# Patient Record
Sex: FEMALE | Race: WHITE | NOT HISPANIC OR LATINO | URBAN - METROPOLITAN AREA
[De-identification: names, ages, dates, MRNs, and addresses within clinical notes are randomized per-mention and may not be internally consistent; named-entity substitution may affect disease eponyms.]

---

## 2017-07-21 ENCOUNTER — IMPORTED ENCOUNTER (OUTPATIENT)
Dept: URBAN - METROPOLITAN AREA CLINIC 59 | Facility: CLINIC | Age: 57
End: 2017-07-21

## 2017-07-21 PROBLEM — H04.123 TEAR FILM INSUFFICIENCY OF BILATERAL LACRIMAL GLANDS: Noted: 2017-07-21

## 2017-07-21 PROCEDURE — 92015 DETERMINE REFRACTIVE STATE: CPT

## 2017-07-21 PROCEDURE — 99213 OFFICE O/P EST LOW 20 MIN: CPT

## 2018-07-20 ENCOUNTER — IMPORTED ENCOUNTER (OUTPATIENT)
Dept: URBAN - METROPOLITAN AREA CLINIC 59 | Facility: CLINIC | Age: 58
End: 2018-07-20

## 2018-07-20 PROBLEM — H04.123 TEAR FILM INSUFFICIENCY OF BILATERAL LACRIMAL GLANDS: Noted: 2018-07-20

## 2018-07-20 PROCEDURE — 99213 OFFICE O/P EST LOW 20 MIN: CPT

## 2018-07-20 PROCEDURE — 92015 DETERMINE REFRACTIVE STATE: CPT

## 2018-10-18 ENCOUNTER — TELEPHONE (OUTPATIENT)
Dept: NEUROLOGY | Facility: CLINIC | Age: 58
End: 2018-10-18

## 2018-10-18 NOTE — TELEPHONE ENCOUNTER
Oralia fell and hit her head 1.5 weeks ago while out shopping. Ever since, she has had severe headaches from her temple down into her jaw. She had a CT scan that was normal, but she is in severe pain. She is taking tylenol, but it is not helping.

## 2018-12-14 ENCOUNTER — OFFICE VISIT (OUTPATIENT)
Dept: NEUROLOGY | Facility: CLINIC | Age: 58
End: 2018-12-14
Attending: PSYCHIATRY & NEUROLOGY
Payer: COMMERCIAL

## 2018-12-14 VITALS — HEART RATE: 74 BPM | RESPIRATION RATE: 14 BRPM | SYSTOLIC BLOOD PRESSURE: 106 MMHG | DIASTOLIC BLOOD PRESSURE: 70 MMHG

## 2018-12-14 DIAGNOSIS — R51.9 NONINTRACTABLE HEADACHE, UNSPECIFIED CHRONICITY PATTERN, UNSPECIFIED HEADACHE TYPE: Primary | ICD-10-CM

## 2018-12-14 DIAGNOSIS — G08 CEREBRAL VENOUS SINUS THROMBOSIS: ICD-10-CM

## 2018-12-14 PROCEDURE — 99213 OFFICE O/P EST LOW 20 MIN: CPT | Performed by: PSYCHIATRY & NEUROLOGY

## 2018-12-14 RX ORDER — TERBUTALINE SULFATE 2.5 MG/1
1 TABLET ORAL 3 TIMES DAILY PRN
COMMUNITY
Start: 2018-09-17

## 2018-12-14 RX ORDER — BUDESONIDE AND FORMOTEROL FUMARATE DIHYDRATE 160; 4.5 UG/1; UG/1
2 AEROSOL RESPIRATORY (INHALATION) 2 TIMES DAILY
COMMUNITY
Start: 2018-06-12

## 2018-12-14 RX ORDER — LORAZEPAM 0.5 MG/1
0.5 TABLET ORAL 3 TIMES DAILY PRN
COMMUNITY
Start: 2017-09-04

## 2018-12-14 RX ORDER — MONTELUKAST SODIUM 10 MG/1
10 TABLET ORAL DAILY
COMMUNITY
Start: 2017-11-30

## 2018-12-14 RX ORDER — WARFARIN SODIUM 5 MG/1
10 TABLET ORAL DAILY
COMMUNITY
Start: 2018-10-23

## 2018-12-14 RX ORDER — ALBUTEROL SULFATE 90 UG/1
2 INHALANT RESPIRATORY (INHALATION) EVERY 6 HOURS PRN
COMMUNITY

## 2018-12-14 NOTE — PROGRESS NOTES
"Oralia Hernandes is a 58 y.o. female  12/14/2018  Chalo Simth MD    Neurology Follow Up Note    Subjective     Oralia Hernandes is a 58 y.o. female who is being evaluated  for headache.  I previously saw the patient about 1 year ago.  As you know she has a history of a venous sinus thrombosis and hypercoagulable state.  She was stable on Coumadin and I recommended no changes in her management.    Since her last visit, the patient reported that she had been doing well.  On October 6 she unfortunately fell at a [employer] and struck her right head forcefully on boxes of tiles.  While she did not lose consciousness she was extremely shaken up and \"saw stars\".  She developed a headache which progressively got worse over time.  A CAT scan of the head was unremarkable.  Over time she developed severe pain over the right head, face and teeth.  She was unable to work.    The patient returned to the emergency room.  A repeat CAT scan revealed a severe sinus infection.  She was treated with steroids, antibiotics and analgesics.  Over time, her condition has slowly improved although she is not 100% back to her baseline.  Fortunately she has had no other episodes of transient or static neurologic dysfunction.  A comprehensive review of systems was otherwise unremarkable.    Review of Systems  Constitutional: negative  Eyes: negative  Ears, nose, mouth, throat, and face: negative  Respiratory: negative  Cardiovascular: negative  Gastrointestinal: negative  Genitourinary:negative  Integument/breast: negative  Hematologic/lymphatic: negative  Musculoskeletal:negative  Neurological: negative  Behavioral/Psych: negative  Endocrine: negative  Allergic/Immunologic: negative    Current Outpatient Prescriptions   Medication Sig Dispense Refill   • budesonide-formoterol (SYMBICORT) 160-4.5 mcg/actuation inhaler Inhale 2 puffs 2 times daily.     • LORazepam (ATIVAN) 0.5 mg tablet Take 0.5 mg by mouth 3 (three) times a day as needed.   "   • montelukast (SINGULAIR) 10 mg tablet Take 10 mg by mouth daily.     • albuterol HFA (PROVENTIL HFA) 90 mcg/actuation inhaler Inhale 2 puffs every 6 (six) hours as needed.     • terbutaline (BRETHINE) 2.5 mg tablet Take 1 tablet by mouth 3 (three) times a day as needed.     • warfarin (COUMADIN) 5 mg tablet Take 10 mg by mouth once daily.       No current facility-administered medications for this visit.        PMH/SH/FH : Unchanged since previous visit.    Objective     Physical Exam  /70   Pulse 74   Resp 14     General Appearance:  Alert, no distress, appears stated age               Neurologic Exam:  Alert and oriented. Attention, concentration, memory, language, visual spatial orientation, executive function is normal.    Pupils equal round and reactive to light. Extraocular movement full with normal pursuit + saccades. No nystagmus noted.   Facial strength and sensation is normal. Hearing normal.  The tongue and uvula were midline. No dysarthria or dysphagia.   Strength was 5/5 in bulbar, axial + extremity muscles.There was normal bulk and tone with no abnormal movements.   The sensory examination was normal to touch, temperature and pain, vibration and proprioception. There was no dysmetria or cerebellar signs.   The gait was narrow based. Patient was able to tandem walk. Negative Romberg sign. Reflexes were ++ and symmetric. Merrill sign was negative. Plantar responses were flexor.       Problem List Items Addressed This Visit     Cerebral venous sinus thrombosis    Current Assessment & Plan     Trisha has a history of a venous sinus thrombosis and hypercoagulability.  Currently she is on full anticoagulation and is followed closely by hematology         Nonintractable headache - Primary    Current Assessment & Plan     The patient likely had a mild concussion and is recovering from a sinus infection.  She will continue to pursue conservative measures.               It was a real pleasure treating  Oralia Hernandes today, thank you for allowing me to participate in the medical care. If you have any questions, please call me at any time. Oralia Hernandes will follow up with me in the coming weeks to months and keep me updated by telephone. Oralia Hernandes knows to notify me immediately if there is any change in the condition or if there are any new symptoms of transient or static neurologic dysfunction.    Justin Branch MD

## 2018-12-14 NOTE — LETTER
"December 14, 2018     Chalo Smith MD  [address]  [address]  [place] [address]    Patient: Oralia Hernandes   YOB: 1960   Date of Visit: 12/14/2018       Dear Dr. Smith:    Thank you for referring Oralia Hernandes to me for evaluation. Below are my notes for this consultation.    If you have questions, please do not hesitate to call me. I look forward to following your patient along with you.         Sincerely,        Justin Branch MD        CC: No Recipients  Justin Branch MD  12/14/2018 10:47 AM  Signed  Oralia Hernandes is a 58 y.o. female  12/14/2018  Chalo Smith MD    Neurology Follow Up Note    Subjective     Oralia Hernandes is a 58 y.o. female who is being evaluated  for headache.  I previously saw the patient about 1 year ago.  As you know she has a history of a venous sinus thrombosis and hypercoagulable state.  She was stable on Coumadin and I recommended no changes in her management.    Since her last visit, the patient reported that she had been doing well.  On October 6 she unfortunately fell at a [employer] and struck her right head forcefully on boxes of tiles.  While she did not lose consciousness she was extremely shaken up and \"saw stars\".  She developed a headache which progressively got worse over time.  A CAT scan of the head was unremarkable.  Over time she developed severe pain over the right head, face and teeth.  She was unable to work.    The patient returned to the emergency room.  A repeat CAT scan revealed a severe sinus infection.  She was treated with steroids, antibiotics and analgesics.  Over time, her condition has slowly improved although she is not 100% back to her baseline.  Fortunately she has had no other episodes of transient or static neurologic dysfunction.  A comprehensive review of systems was otherwise unremarkable.    Review of Systems  Constitutional: negative  Eyes: negative  Ears, nose, mouth, throat, and face: negative  Respiratory: " negative  Cardiovascular: negative  Gastrointestinal: negative  Genitourinary:negative  Integument/breast: negative  Hematologic/lymphatic: negative  Musculoskeletal:negative  Neurological: negative  Behavioral/Psych: negative  Endocrine: negative  Allergic/Immunologic: negative    Current Outpatient Prescriptions   Medication Sig Dispense Refill   • budesonide-formoterol (SYMBICORT) 160-4.5 mcg/actuation inhaler Inhale 2 puffs 2 times daily.     • LORazepam (ATIVAN) 0.5 mg tablet Take 0.5 mg by mouth 3 (three) times a day as needed.     • montelukast (SINGULAIR) 10 mg tablet Take 10 mg by mouth daily.     • albuterol HFA (PROVENTIL HFA) 90 mcg/actuation inhaler Inhale 2 puffs every 6 (six) hours as needed.     • terbutaline (BRETHINE) 2.5 mg tablet Take 1 tablet by mouth 3 (three) times a day as needed.     • warfarin (COUMADIN) 5 mg tablet Take 10 mg by mouth once daily.       No current facility-administered medications for this visit.        PMH/SH/FH : Unchanged since previous visit.    Objective     Physical Exam  /70   Pulse 74   Resp 14     General Appearance:  Alert, no distress, appears stated age               Neurologic Exam:  Alert and oriented. Attention, concentration, memory, language, visual spatial orientation, executive function is normal.    Pupils equal round and reactive to light. Extraocular movement full with normal pursuit + saccades. No nystagmus noted.   Facial strength and sensation is normal. Hearing normal.  The tongue and uvula were midline. No dysarthria or dysphagia.   Strength was 5/5 in bulbar, axial + extremity muscles.There was normal bulk and tone with no abnormal movements.   The sensory examination was normal to touch, temperature and pain, vibration and proprioception. There was no dysmetria or cerebellar signs.   The gait was narrow based. Patient was able to tandem walk. Negative Romberg sign. Reflexes were ++ and symmetric. Merrill sign was negative. Plantar  responses were flexor.       Problem List Items Addressed This Visit     Cerebral venous sinus thrombosis    Current Assessment & Plan     Trisha has a history of a venous sinus thrombosis and hypercoagulability.  Currently she is on full anticoagulation and is followed closely by hematology         Nonintractable headache - Primary    Current Assessment & Plan     The patient likely had a mild concussion and is recovering from a sinus infection.  She will continue to pursue conservative measures.               It was a real pleasure treating Oralia Hernandes today, thank you for allowing me to participate in the medical care. If you have any questions, please call me at any time. Oralia Hernandes will follow up with me in the coming weeks to months and keep me updated by telephone. Oralia Hernandes knows to notify me immediately if there is any change in the condition or if there are any new symptoms of transient or static neurologic dysfunction.    Justin Branch MD

## 2018-12-14 NOTE — ASSESSMENT & PLAN NOTE
The patient likely had a mild concussion and is recovering from a sinus infection.  She will continue to pursue conservative measures.

## 2018-12-14 NOTE — ASSESSMENT & PLAN NOTE
has a history of a venous sinus thrombosis and hypercoagulability.  Currently she is on full anticoagulation and is followed closely by hematology

## 2019-08-06 ENCOUNTER — IMPORTED ENCOUNTER (OUTPATIENT)
Dept: URBAN - METROPOLITAN AREA CLINIC 59 | Facility: CLINIC | Age: 59
End: 2019-08-06

## 2019-08-06 PROBLEM — H04.123 TEAR FILM INSUFFICIENCY OF BILATERAL LACRIMAL GLANDS: Noted: 2019-08-06

## 2019-08-06 PROCEDURE — 99213 OFFICE O/P EST LOW 20 MIN: CPT

## 2020-02-18 ENCOUNTER — OFFICE VISIT (OUTPATIENT)
Dept: NEUROLOGY | Facility: CLINIC | Age: 60
End: 2020-02-18
Payer: COMMERCIAL

## 2020-02-18 VITALS
DIASTOLIC BLOOD PRESSURE: 80 MMHG | HEART RATE: 73 BPM | RESPIRATION RATE: 14 BRPM | OXYGEN SATURATION: 98 % | SYSTOLIC BLOOD PRESSURE: 122 MMHG

## 2020-02-18 DIAGNOSIS — R42 VERTIGO: ICD-10-CM

## 2020-02-18 DIAGNOSIS — R51.9 NONINTRACTABLE HEADACHE, UNSPECIFIED CHRONICITY PATTERN, UNSPECIFIED HEADACHE TYPE: ICD-10-CM

## 2020-02-18 DIAGNOSIS — G08 CEREBRAL VENOUS SINUS THROMBOSIS: Primary | ICD-10-CM

## 2020-02-18 PROBLEM — R49.0 HOARSENESS: Status: ACTIVE | Noted: 2018-09-07

## 2020-02-18 PROBLEM — J45.20 MILD INTERMITTENT ASTHMA WITHOUT COMPLICATION: Status: ACTIVE | Noted: 2018-09-07

## 2020-02-18 PROCEDURE — 99214 OFFICE O/P EST MOD 30 MIN: CPT | Performed by: PSYCHIATRY & NEUROLOGY

## 2020-02-18 NOTE — ASSESSMENT & PLAN NOTE
The patient has a history of a venous sinus thrombosis and hypercoagulability.  Currently she is on full anticoagulation and is followed closely by hematology.  She has been clinically asymptomatic.

## 2020-02-18 NOTE — LETTER
February 18, 2020     Chalo Smith MD  [address]  [address]  [place] [address]    Patient: Oralia Hernandes  YOB: 1960  Date of Visit: 2/18/2020      Dear Dr. Smith:    Thank you for referring Oralia Hernandes to me for evaluation. Below are my notes for this consultation.    If you have questions, please do not hesitate to call me. I look forward to following your patient along with you.         Sincerely,        Justin Branch MD        CC: No Recipients  Justin Branch MD  2/18/2020  9:22 AM  Signed  Oralia Hernandes is a 59 y.o. female  2/18/2020  Chalo Smith MD    Neurology Follow Up Note    Subjective     Oralia Hernandes is a 59 y.o. female who is being evaluated  for venous sinus thrombosis.  I previously saw the patient about 1 year ago.  At that time she was stable on Coumadin.  She was having intermittent headaches which were gradually improving with conservative measures.    Since her last visit, overall the patient in general has been doing well.  Unfortunately she had a bout of severe vertigo which lasted several weeks.  She went to an outside hospital where an MRI of the brain and comprehensive work-up were unremarkable.  She was evaluated by an ENT specialist at [facility] and fortunately was found to have benign paroxysmal positional vertigo.  Fortunately over the past few months she has been otherwise completely asymptomatic.    The patient also reported that in December she had an episode of imbalance in which she fell and severely twisted her right ankle.  It has taken about 2 months for this injury to improve.  Fortunately the patient's headaches have essentially resolved.  Every once in a while she will have a mild headache if she does not drink coffee.  Conservative measures or an over-the-counter anti-inflammatory medication or analgesic work well in aborting her pain.  The patient has been eating and sleeping well and her mood is good.  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  Detailed neurologic and medical review of systems was otherwise unremarkable.  There were no symptoms to suggest increased intracranial pressure, meningitis or systemic illness.    Review of Systems  Constitutional: negative  Eyes: negative  Ears, nose, mouth, throat, and face: negative  Respiratory: negative  Cardiovascular: negative  Gastrointestinal: negative  Genitourinary:negative  Integument/breast: negative  Hematologic/lymphatic: negative  Musculoskeletal:negative  Neurological: negative  Behavioral/Psych: negative  Endocrine: negative  Allergic/Immunologic: negative    Current Outpatient Medications   Medication Sig Dispense Refill   • albuterol HFA (PROVENTIL HFA) 90 mcg/actuation inhaler Inhale 2 puffs every 6 (six) hours as needed.     • budesonide-formoterol (SYMBICORT) 160-4.5 mcg/actuation inhaler Inhale 2 puffs 2 times daily.     • LORazepam (ATIVAN) 0.5 mg tablet Take 0.5 mg by mouth 3 (three) times a day as needed.     • montelukast (SINGULAIR) 10 mg tablet Take 10 mg by mouth daily.     • terbutaline (BRETHINE) 2.5 mg tablet Take 1 tablet by mouth 3 (three) times a day as needed.     • warfarin (COUMADIN) 5 mg tablet Take 10 mg by mouth once daily.       No current facility-administered medications for this visit.        PMH/SH/FH : Unchanged since previous visit.    Objective     Physical Exam  Visit Vitals  /80 (BP Location: Left upper arm, Patient Position: Sitting)   Pulse 73   Resp 14   SpO2 98%       General Appearance:  Alert, no distress, appears stated age               Neurologic Exam:  Alert and oriented. Attention, concentration, memory, language, visual spatial orientation, executive function is normal.    Pupils equal round and reactive to light. Extraocular movement full with normal pursuit + saccades. No nystagmus noted.   Facial strength and sensation is normal. Hearing normal.  The tongue and uvula were midline. No dysarthria or dysphagia.    Strength was 5/5 in bulbar, axial + extremity muscles.There was normal bulk and tone with no abnormal movements.   The sensory examination was normal to touch, temperature and pain, vibration and proprioception. There was no dysmetria or cerebellar signs.   The gait was narrow based. Patient was able to tandem walk. Negative Romberg sign. Reflexes were ++ and symmetric. Merrill sign was negative. Plantar responses were flexor.         Problem List Items Addressed This Visit        Nervous    Cerebral venous sinus thrombosis - Primary    Current Assessment & Plan     The patient has a history of a venous sinus thrombosis and hypercoagulability.  Currently she is on full anticoagulation and is followed closely by hematology.  She has been clinically asymptomatic.         Nonintractable headache    Current Assessment & Plan     The patient's headaches fortunately have resolved.  Any run-of-the-mill headache she may have responds to conservative measures.            Other    Vertigo    Current Assessment & Plan     The patient had benign paroxysmal positional vertigo.  Fortunately this benign condition has resolved with conservative measures.               It was a real pleasure treating Oralia Cecilio today, thank you for allowing me to participate in the medical care. If you have any questions, please call me at any time. Oralia Hernandes will follow up with me in the coming weeks to months and keep me updated by telephone. Oralia Hernandes knows to notify me immediately if there is any change in the condition or if there are any new symptoms of transient or static neurologic dysfunction.    Justin Branch MD

## 2020-02-18 NOTE — ASSESSMENT & PLAN NOTE
The patient had benign paroxysmal positional vertigo.  Fortunately this benign condition has resolved with conservative measures.

## 2020-02-18 NOTE — PROGRESS NOTES
Oralia Hernandes is a 59 y.o. female  2/18/2020  Chalo Smith MD    Neurology Follow Up Note    Subjective     Oralia Hernandes is a 59 y.o. female who is being evaluated  for venous sinus thrombosis.  I previously saw the patient about 1 year ago.  At that time she was stable on Coumadin.  She was having intermittent headaches which were gradually improving with conservative measures.    Since her last visit, overall the patient in general has been doing well.  Unfortunately she had a bout of severe vertigo which lasted several weeks.  She went to an outside hospital where an MRI of the brain and comprehensive work-up were unremarkable.  She was evaluated by an ENT specialist at [facility] and fortunately was found to have benign paroxysmal positional vertigo.  Fortunately over the past few months she has been otherwise completely asymptomatic.    The patient also reported that in December she had an episode of imbalance in which she fell and severely twisted her right ankle.  It has taken about 2 months for this injury to improve.  Fortunately the patient's headaches have essentially resolved.  Every once in a while she will have a mild headache if she does not drink coffee.  Conservative measures or an over-the-counter anti-inflammatory medication or analgesic work well in aborting her pain.  The patient has been eating and sleeping well and her mood is good.  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  Detailed neurologic and medical review of systems was otherwise unremarkable.  There were no symptoms to suggest increased intracranial pressure, meningitis or systemic illness.    Review of Systems  Constitutional: negative  Eyes: negative  Ears, nose, mouth, throat, and face: negative  Respiratory: negative  Cardiovascular: negative  Gastrointestinal: negative  Genitourinary:negative  Integument/breast: negative  Hematologic/lymphatic: negative  Musculoskeletal:negative  Neurological:  negative  Behavioral/Psych: negative  Endocrine: negative  Allergic/Immunologic: negative    Current Outpatient Medications   Medication Sig Dispense Refill   • albuterol HFA (PROVENTIL HFA) 90 mcg/actuation inhaler Inhale 2 puffs every 6 (six) hours as needed.     • budesonide-formoterol (SYMBICORT) 160-4.5 mcg/actuation inhaler Inhale 2 puffs 2 times daily.     • LORazepam (ATIVAN) 0.5 mg tablet Take 0.5 mg by mouth 3 (three) times a day as needed.     • montelukast (SINGULAIR) 10 mg tablet Take 10 mg by mouth daily.     • terbutaline (BRETHINE) 2.5 mg tablet Take 1 tablet by mouth 3 (three) times a day as needed.     • warfarin (COUMADIN) 5 mg tablet Take 10 mg by mouth once daily.       No current facility-administered medications for this visit.        PMH/SH/FH : Unchanged since previous visit.    Objective     Physical Exam  Visit Vitals  /80 (BP Location: Left upper arm, Patient Position: Sitting)   Pulse 73   Resp 14   SpO2 98%       General Appearance:  Alert, no distress, appears stated age               Neurologic Exam:  Alert and oriented. Attention, concentration, memory, language, visual spatial orientation, executive function is normal.    Pupils equal round and reactive to light. Extraocular movement full with normal pursuit + saccades. No nystagmus noted.   Facial strength and sensation is normal. Hearing normal.  The tongue and uvula were midline. No dysarthria or dysphagia.   Strength was 5/5 in bulbar, axial + extremity muscles.There was normal bulk and tone with no abnormal movements.   The sensory examination was normal to touch, temperature and pain, vibration and proprioception. There was no dysmetria or cerebellar signs.   The gait was narrow based. Patient was able to tandem walk. Negative Romberg sign. Reflexes were ++ and symmetric. Merrill sign was negative. Plantar responses were flexor.         Problem List Items Addressed This Visit        Nervous    Cerebral venous sinus  thrombosis - Primary    Current Assessment & Plan     The patient has a history of a venous sinus thrombosis and hypercoagulability.  Currently she is on full anticoagulation and is followed closely by hematology.  She has been clinically asymptomatic.         Nonintractable headache    Current Assessment & Plan     The patient's headaches fortunately have resolved.  Any run-of-the-mill headache she may have responds to conservative measures.            Other    Vertigo    Current Assessment & Plan     The patient had benign paroxysmal positional vertigo.  Fortunately this benign condition has resolved with conservative measures.               It was a real pleasure treating Oralia Cecilio today, thank you for allowing me to participate in the medical care. If you have any questions, please call me at any time. Oralia Hernandes will follow up with me in the coming weeks to months and keep me updated by telephone. Oralia Hernandes knows to notify me immediately if there is any change in the condition or if there are any new symptoms of transient or static neurologic dysfunction.    Justin Branch MD

## 2020-02-18 NOTE — ASSESSMENT & PLAN NOTE
The patient's headaches fortunately have resolved.  Any run-of-the-mill headache she may have responds to conservative measures.

## 2020-07-28 ENCOUNTER — IMPORTED ENCOUNTER (OUTPATIENT)
Dept: URBAN - METROPOLITAN AREA CLINIC 59 | Facility: CLINIC | Age: 60
End: 2020-07-28

## 2020-07-28 PROBLEM — H04.123 TEAR FILM INSUFFICIENCY OF BILATERAL LACRIMAL GLANDS: Noted: 2020-07-28

## 2020-07-28 PROCEDURE — 92014 COMPRE OPH EXAM EST PT 1/>: CPT

## 2021-03-05 ENCOUNTER — OFFICE VISIT (OUTPATIENT)
Dept: NEUROLOGY | Facility: CLINIC | Age: 61
End: 2021-03-05
Payer: COMMERCIAL

## 2021-03-05 VITALS
RESPIRATION RATE: 16 BRPM | OXYGEN SATURATION: 98 % | WEIGHT: 130 LBS | BODY MASS INDEX: 23.04 KG/M2 | HEIGHT: 63 IN | HEART RATE: 72 BPM | DIASTOLIC BLOOD PRESSURE: 83 MMHG | SYSTOLIC BLOOD PRESSURE: 131 MMHG

## 2021-03-05 DIAGNOSIS — R51.9 NONINTRACTABLE HEADACHE, UNSPECIFIED CHRONICITY PATTERN, UNSPECIFIED HEADACHE TYPE: Primary | ICD-10-CM

## 2021-03-05 DIAGNOSIS — G08 CEREBRAL VENOUS SINUS THROMBOSIS: ICD-10-CM

## 2021-03-05 DIAGNOSIS — R42 VERTIGO: ICD-10-CM

## 2021-03-05 PROCEDURE — 99213 OFFICE O/P EST LOW 20 MIN: CPT | Performed by: PSYCHIATRY & NEUROLOGY

## 2021-03-05 NOTE — ASSESSMENT & PLAN NOTE
The patient had a venous sinus thrombosis.  She is now on full anticoagulation and is doing great.

## 2021-03-05 NOTE — ASSESSMENT & PLAN NOTE
The patient's headaches have essentially resolved.  She may continue to have intermittent pain in the occiput which is benign and not the source of concern.  She will continue to pursue conservative measures.

## 2021-03-05 NOTE — LETTER
"March 5, 2021     Chalo Smith MD  [address] [facility]   [address]  [place] [address]    Patient: Oralia Hernandes  YOB: 1960  Date of Visit: 3/5/2021      Dear Dr. Smith:    Thank you for referring Oralia Hernandes to me for evaluation. Below are my notes for this consultation.    If you have questions, please do not hesitate to call me. I look forward to following your patient along with you.         Sincerely,        Justin Branch MD        CC: No Recipients  Justin Branch MD  3/5/2021 10:22 AM  Signed  Oralia Hernandes is a 60 y.o. female  3/5/2021  Chalo Smith MD    Neurology Follow Up Note    Subjective     Oralia Hernandes is a 60 y.o. female who is being evaluated  for a venous sinus thrombosis.  I previously saw the patient 1 year ago.  At that time she was stable on Coumadin and had intermittent headaches and dizziness.  I recommended conservative measures.    Since her last visit, overall the patient reported that she has been doing quite well.  She has a rare run-of-the-mill headache without any associated symptoms which responds to conservative measures.  In addition, she has had about 1 or 2 brief episodes of dizziness/vertigo.  These come out of nowhere, are unaccompanied by other medical or neurologic symptoms and are quite brief lasting only seconds to minutes at a time.  Her events do not negatively impact her activities of daily living or quality of life in any way.    The patient reported that she continues to occasionally have \"phantom pain\" and discomfort in the right occiput, the site of her prior clot.  She remains however on Coumadin and is tolerating the medication well without any side effects.  She occasionally has Raynaud's phenomenon in the hands with a cold feeling and a white change in her skin color.  She battles insomnia.  She has been eating well and her mood is good.  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    Review of Systems  Constitutional: negative  Eyes: negative  Ears, nose, mouth, throat, and face: negative  Respiratory: negative  Cardiovascular: negative  Gastrointestinal: negative  Genitourinary:negative  Integument/breast: negative  Hematologic/lymphatic: negative  Musculoskeletal:negative  Neurological: negative  Behavioral/Psych: negative  Endocrine: negative  Allergic/Immunologic: negative    Current Outpatient Medications   Medication Sig Dispense Refill   • albuterol HFA (PROVENTIL HFA) 90 mcg/actuation inhaler Inhale 2 puffs every 6 (six) hours as needed.     • budesonide-formoterol (SYMBICORT) 160-4.5 mcg/actuation inhaler Inhale 2 puffs 2 times daily.     • LORazepam (ATIVAN) 0.5 mg tablet Take 0.5 mg by mouth 3 (three) times a day as needed.     • montelukast (SINGULAIR) 10 mg tablet Take 10 mg by mouth daily.     • terbutaline (BRETHINE) 2.5 mg tablet Take 1 tablet by mouth 3 (three) times a day as needed.     • warfarin (COUMADIN) 5 mg tablet Take 10 mg by mouth once daily.       No current facility-administered medications for this visit.        PMH/SH/FH : Unchanged since previous visit.    Objective     Physical Exam  Visit Vitals  /83 (BP Location: Right upper arm, Patient Position: Sitting)   Pulse 72   Resp 16   Ht 1.6 m (5' 3\")   Wt 59 kg (130 lb)   SpO2 98%   BMI 23.03 kg/m²       General Appearance:  Alert, no distress, appears stated age               Neurologic Exam:  Alert and oriented. Attention, concentration, memory, language, visual spatial orientation, executive function is normal.    Pupils equal round and reactive to light. Extraocular movement full with normal pursuit + saccades. No nystagmus noted.   Facial strength and sensation is normal. Hearing normal.  The tongue and uvula were midline. No dysarthria or dysphagia.   Strength was 5/5 in bulbar, axial + extremity muscles.There was normal bulk " and tone with no abnormal movements.   The sensory examination was normal to touch, temperature and pain, vibration and proprioception. There was no dysmetria or cerebellar signs.   The gait was narrow based. Patient was able to tandem walk. Negative Romberg sign. Reflexes were ++ and symmetric. Merrill sign was negative. Plantar responses were flexor.         Problem List Items Addressed This Visit        Nervous    Nonintractable headache - Primary    Current Assessment & Plan     The patient's headaches have essentially resolved.  She may continue to have intermittent pain in the occiput which is benign and not the source of concern.  She will continue to pursue conservative measures.            Circulatory    Cerebral venous sinus thrombosis    Current Assessment & Plan     The patient had a venous sinus thrombosis.  She is now on full anticoagulation and is doing great.            Other    Vertigo    Current Assessment & Plan     The patient had benign paroxysmal positional vertigo.  Fortunately this benign condition has resolved with conservative measures.  She may have an intermittent bouts of dizziness which is nothing to worry about.               It was a real pleasure treating Oralia Hernandes today, thank you for allowing me to participate in the medical care. If you have any questions, please call me at any time. Oralia Hernandes will follow up with me in the coming weeks to months and keep me updated by telephone. Oralia Hernandes knows to notify me immediately if there is any change in the condition or if there are any new symptoms of transient or static neurologic dysfunction.    Justin Branch MD

## 2021-03-05 NOTE — PROGRESS NOTES
"Oralia Hernandes is a 60 y.o. female  3/5/2021  Chalo Smith MD    Neurology Follow Up Note    Subjective     Oralia Hernandes is a 60 y.o. female who is being evaluated  for a venous sinus thrombosis.  I previously saw the patient 1 year ago.  At that time she was stable on Coumadin and had intermittent headaches and dizziness.  I recommended conservative measures.    Since her last visit, overall the patient reported that she has been doing quite well.  She has a rare run-of-the-mill headache without any associated symptoms which responds to conservative measures.  In addition, she has had about 1 or 2 brief episodes of dizziness/vertigo.  These come out of nowhere, are unaccompanied by other medical or neurologic symptoms and are quite brief lasting only seconds to minutes at a time.  Her events do not negatively impact her activities of daily living or quality of life in any way.    The patient reported that she continues to occasionally have \"phantom pain\" and discomfort in the right occiput, the site of her prior clot.  She remains however on Coumadin and is tolerating the medication well without any side effects.  She occasionally has Raynaud's phenomenon in the hands with a cold feeling and a white change in her skin color.  She battles insomnia.  She has been eating well and her mood is good.  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    Review of Systems  Constitutional: negative  Eyes: negative  Ears, nose, mouth, throat, and face: negative  Respiratory: negative  Cardiovascular: negative  Gastrointestinal: negative  Genitourinary:negative  Integument/breast: negative  Hematologic/lymphatic: negative  Musculoskeletal:negative  Neurological: negative  Behavioral/Psych: negative  Endocrine: negative  Allergic/Immunologic: negative    Current Outpatient Medications " "  Medication Sig Dispense Refill   • albuterol HFA (PROVENTIL HFA) 90 mcg/actuation inhaler Inhale 2 puffs every 6 (six) hours as needed.     • budesonide-formoterol (SYMBICORT) 160-4.5 mcg/actuation inhaler Inhale 2 puffs 2 times daily.     • LORazepam (ATIVAN) 0.5 mg tablet Take 0.5 mg by mouth 3 (three) times a day as needed.     • montelukast (SINGULAIR) 10 mg tablet Take 10 mg by mouth daily.     • terbutaline (BRETHINE) 2.5 mg tablet Take 1 tablet by mouth 3 (three) times a day as needed.     • warfarin (COUMADIN) 5 mg tablet Take 10 mg by mouth once daily.       No current facility-administered medications for this visit.        PMH/SH/FH : Unchanged since previous visit.    Objective     Physical Exam  Visit Vitals  /83 (BP Location: Right upper arm, Patient Position: Sitting)   Pulse 72   Resp 16   Ht 1.6 m (5' 3\")   Wt 59 kg (130 lb)   SpO2 98%   BMI 23.03 kg/m²       General Appearance:  Alert, no distress, appears stated age               Neurologic Exam:  Alert and oriented. Attention, concentration, memory, language, visual spatial orientation, executive function is normal.    Pupils equal round and reactive to light. Extraocular movement full with normal pursuit + saccades. No nystagmus noted.   Facial strength and sensation is normal. Hearing normal.  The tongue and uvula were midline. No dysarthria or dysphagia.   Strength was 5/5 in bulbar, axial + extremity muscles.There was normal bulk and tone with no abnormal movements.   The sensory examination was normal to touch, temperature and pain, vibration and proprioception. There was no dysmetria or cerebellar signs.   The gait was narrow based. Patient was able to tandem walk. Negative Romberg sign. Reflexes were ++ and symmetric. Merrill sign was negative. Plantar responses were flexor.         Problem List Items Addressed This Visit        Nervous    Nonintractable headache - Primary    Current Assessment & Plan     The patient's headaches " have essentially resolved.  She may continue to have intermittent pain in the occiput which is benign and not the source of concern.  She will continue to pursue conservative measures.            Circulatory    Cerebral venous sinus thrombosis    Current Assessment & Plan     The patient had a venous sinus thrombosis.  She is now on full anticoagulation and is doing great.            Other    Vertigo    Current Assessment & Plan     The patient had benign paroxysmal positional vertigo.  Fortunately this benign condition has resolved with conservative measures.  She may have an intermittent bouts of dizziness which is nothing to worry about.               It was a real pleasure treating Oralia Cecilio today, thank you for allowing me to participate in the medical care. If you have any questions, please call me at any time. Oralia Hernandes will follow up with me in the coming weeks to months and keep me updated by telephone. Oralia Hernandes knows to notify me immediately if there is any change in the condition or if there are any new symptoms of transient or static neurologic dysfunction.    Justin Branch MD

## 2021-03-05 NOTE — ASSESSMENT & PLAN NOTE
The patient had benign paroxysmal positional vertigo.  Fortunately this benign condition has resolved with conservative measures.  She may have an intermittent bouts of dizziness which is nothing to worry about.

## 2021-07-27 ENCOUNTER — IMPORTED ENCOUNTER (OUTPATIENT)
Dept: URBAN - METROPOLITAN AREA CLINIC 59 | Facility: CLINIC | Age: 61
End: 2021-07-27

## 2021-07-27 PROBLEM — H25.13 BILATERAL NUCLEAR SCLEROSIS CATARACTS: Noted: 2021-07-27

## 2021-07-27 PROBLEM — C50.812 MALIGNANT NEOPLASM OF OVERLAPPING SITES OF LEFT FEMALE BREAST: Noted: 2021-07-27

## 2021-07-27 PROBLEM — H04.123 TEAR FILM INSUFFICIENCY OF BILATERAL LACRIMAL GLANDS: Noted: 2021-07-27

## 2021-07-27 PROCEDURE — 99213 OFFICE O/P EST LOW 20 MIN: CPT

## 2022-03-04 ENCOUNTER — OFFICE VISIT (OUTPATIENT)
Dept: NEUROLOGY | Facility: CLINIC | Age: 62
End: 2022-03-04
Payer: COMMERCIAL

## 2022-03-04 VITALS — OXYGEN SATURATION: 97 % | DIASTOLIC BLOOD PRESSURE: 60 MMHG | HEART RATE: 81 BPM | SYSTOLIC BLOOD PRESSURE: 115 MMHG

## 2022-03-04 DIAGNOSIS — R51.9 NONINTRACTABLE HEADACHE, UNSPECIFIED CHRONICITY PATTERN, UNSPECIFIED HEADACHE TYPE: Primary | ICD-10-CM

## 2022-03-04 DIAGNOSIS — G08 CEREBRAL VENOUS SINUS THROMBOSIS: ICD-10-CM

## 2022-03-04 DIAGNOSIS — R42 VERTIGO: ICD-10-CM

## 2022-03-04 PROCEDURE — 99214 OFFICE O/P EST MOD 30 MIN: CPT | Performed by: PSYCHIATRY & NEUROLOGY

## 2022-03-04 ASSESSMENT — PAIN SCALES - GENERAL: PAINLEVEL: 0-NO PAIN

## 2022-03-04 NOTE — LETTER
"March 4, 2022     Chalo Smith MD  [address] [facility]  [address]  [place] [address]    Patient: Oralia Hernandes  YOB: 1960  Date of Visit: 3/4/2022      Dear Dr. Smith:    Thank you for referring Oralia Hernandes to me for evaluation. Below are my notes for this consultation.    If you have questions, please do not hesitate to call me. I look forward to following your patient along with you.         Sincerely,        Justin Branch MD        CC: No Recipients  Justin Branch MD  3/4/2022 11:02 AM  Signed  Oralia Hernandes is a 61 y.o. female  3/4/2022  Chalo Smith MD    Neurology Follow Up Note    Subjective     Oralia Hernandes is a 61 y.o. female who is being evaluated  for a prior venous sinus thrombosis.  I previously saw the patient 1 year ago.  At that time she was stable and I recommended no changes to her management.    Since her last visit, the patient reported that she has had a crazy past few months.  From a neurology perspective she has done well and went mostly a year without any significant headaches or vertigo.  She did contract Covid at the beginning of this year and had a severe headache for about 1 week.  Shortly thereafter she fell, severely bruising her left knee to the point where he was markedly swollen.  She was evaluated by orthopedics who recommended conservative measures however she was unable to work for approximately 6 weeks.    The patient reported that her  developed Covid and developed a pneumonia and was out of work for 5 weeks.  He had 2 skin cancers removed.  Her daughter had a syncopal event, hit her head and had a laceration which required stitches.  She also suffered a concussion for which she is recovering.  The patient has otherwise been doing well.  She eats well and has some insomnia and uses Benadryl at times to fall asleep.  Her mood has been good.  She occasionally has \"phantom pain\" at the back of her head which comes and goes.  Detailed neurologic and " medical review of systems was unremarkable.  There were no symptoms to suggest increased intracranial pressure, meningitis or systemic illness.  She has otherwise had no episodes of transient or static neurologic dysfunction.    Review of Systems  Constitutional: negative  Eyes: negative  Ears, nose, mouth, throat, and face: negative  Respiratory: negative  Cardiovascular: negative  Gastrointestinal: negative  Genitourinary:negative  Integument/breast: negative  Hematologic/lymphatic: negative  Musculoskeletal:negative  Neurological: negative  Behavioral/Psych: negative  Endocrine: negative  Allergic/Immunologic: negative    Current Outpatient Medications   Medication Sig Dispense Refill   • albuterol HFA (PROVENTIL HFA) 90 mcg/actuation inhaler Inhale 2 puffs every 6 (six) hours as needed.     • budesonide-formoterol (SYMBICORT) 160-4.5 mcg/actuation inhaler Inhale 2 puffs 2 times daily.     • LORazepam (ATIVAN) 0.5 mg tablet Take 0.5 mg by mouth 3 (three) times a day as needed.     • montelukast (SINGULAIR) 10 mg tablet Take 10 mg by mouth daily.     • terbutaline (BRETHINE) 2.5 mg tablet Take 1 tablet by mouth 3 (three) times a day as needed.     • warfarin (COUMADIN) 5 mg tablet Take 10 mg by mouth once daily.       No current facility-administered medications for this visit.       PMH/SH/FH : Unchanged since previous visit.    Objective     Physical Exam  Visit Vitals  /60 (BP Location: Right upper arm, Patient Position: Sitting)   Pulse 81   SpO2 97%       General Appearance:  Alert, no distress, appears stated age               Neurologic Exam:  Alert and oriented. Attention, concentration, memory, language, visual spatial orientation, executive function is normal.    Pupils equal round and reactive to light. Extraocular movement full with normal pursuit + saccades. No nystagmus noted.   Facial strength and sensation is normal. Hearing normal.  The tongue and uvula were midline. No dysarthria or  dysphagia.   Strength was 5/5 in bulbar, axial + extremity muscles.There was normal bulk and tone with no abnormal movements.   The sensory examination was normal to touch, temperature and pain, vibration and proprioception. There was no dysmetria or cerebellar signs.   The gait was narrow based. Patient was able to tandem walk. Negative Romberg sign. Reflexes were ++ and symmetric. Merrill sign was negative. Plantar responses were flexor.         Problem List Items Addressed This Visit        Nervous    Nonintractable headache - Primary    Current Assessment & Plan     The patient's headaches have essentially resolved.  She may continue to have intermittent pain in the occiput which is benign and not the source of concern.  She will continue to pursue conservative measures.            Circulatory    Cerebral venous sinus thrombosis    Current Assessment & Plan     The patient had a venous sinus thrombosis.  She is now on full anticoagulation and is doing great.  No additional diagnostics or treatments are indicated.            Other    Vertigo    Current Assessment & Plan     The patient had benign paroxysmal positional vertigo.  Fortunately this benign condition has resolved with conservative measures.  She may have an intermittent bouts of dizziness which is nothing to worry about.               It was a real pleasure treating Oralia Hernandes today, thank you for allowing me to participate in the medical care. If you have any questions, please call me at any time. Oralia Hernandes will follow up with me in the coming weeks to months and keep me updated by telephone. Oralia Hernandes knows to notify me immediately if there is any change in the condition or if there are any new symptoms of transient or static neurologic dysfunction.    Justin Branch MD

## 2022-03-04 NOTE — PROGRESS NOTES
"Oralia Hernandes is a 61 y.o. female  3/4/2022  Chalo Smith MD    Neurology Follow Up Note    Subjective     Oralia Hernandes is a 61 y.o. female who is being evaluated  for a prior venous sinus thrombosis.  I previously saw the patient 1 year ago.  At that time she was stable and I recommended no changes to her management.    Since her last visit, the patient reported that she has had a crazy past few months.  From a neurology perspective she has done well and went mostly a year without any significant headaches or vertigo.  She did contract Covid at the beginning of this year and had a severe headache for about 1 week.  Shortly thereafter she fell, severely bruising her left knee to the point where he was markedly swollen.  She was evaluated by orthopedics who recommended conservative measures however she was unable to work for approximately 6 weeks.    The patient reported that her  developed Covid and developed a pneumonia and was out of work for 5 weeks.  He had 2 skin cancers removed.  Her daughter had a syncopal event, hit her head and had a laceration which required stitches.  She also suffered a concussion for which she is recovering.  The patient has otherwise been doing well.  She eats well and has some insomnia and uses Benadryl at times to fall asleep.  Her mood has been good.  She occasionally has \"phantom pain\" at the back of her head which comes and goes.  Detailed neurologic and medical review of systems was unremarkable.  There were no symptoms to suggest increased intracranial pressure, meningitis or systemic illness.  She has otherwise had no episodes of transient or static neurologic dysfunction.    Review of Systems  Constitutional: negative  Eyes: negative  Ears, nose, mouth, throat, and face: negative  Respiratory: negative  Cardiovascular: negative  Gastrointestinal: negative  Genitourinary:negative  Integument/breast: negative  Hematologic/lymphatic: " negative  Musculoskeletal:negative  Neurological: negative  Behavioral/Psych: negative  Endocrine: negative  Allergic/Immunologic: negative    Current Outpatient Medications   Medication Sig Dispense Refill   • albuterol HFA (PROVENTIL HFA) 90 mcg/actuation inhaler Inhale 2 puffs every 6 (six) hours as needed.     • budesonide-formoterol (SYMBICORT) 160-4.5 mcg/actuation inhaler Inhale 2 puffs 2 times daily.     • LORazepam (ATIVAN) 0.5 mg tablet Take 0.5 mg by mouth 3 (three) times a day as needed.     • montelukast (SINGULAIR) 10 mg tablet Take 10 mg by mouth daily.     • terbutaline (BRETHINE) 2.5 mg tablet Take 1 tablet by mouth 3 (three) times a day as needed.     • warfarin (COUMADIN) 5 mg tablet Take 10 mg by mouth once daily.       No current facility-administered medications for this visit.       PMH/SH/FH : Unchanged since previous visit.    Objective     Physical Exam  Visit Vitals  /60 (BP Location: Right upper arm, Patient Position: Sitting)   Pulse 81   SpO2 97%       General Appearance:  Alert, no distress, appears stated age               Neurologic Exam:  Alert and oriented. Attention, concentration, memory, language, visual spatial orientation, executive function is normal.    Pupils equal round and reactive to light. Extraocular movement full with normal pursuit + saccades. No nystagmus noted.   Facial strength and sensation is normal. Hearing normal.  The tongue and uvula were midline. No dysarthria or dysphagia.   Strength was 5/5 in bulbar, axial + extremity muscles.There was normal bulk and tone with no abnormal movements.   The sensory examination was normal to touch, temperature and pain, vibration and proprioception. There was no dysmetria or cerebellar signs.   The gait was narrow based. Patient was able to tandem walk. Negative Romberg sign. Reflexes were ++ and symmetric. Merrill sign was negative. Plantar responses were flexor.         Problem List Items Addressed This Visit         Nervous    Nonintractable headache - Primary    Current Assessment & Plan     The patient's headaches have essentially resolved.  She may continue to have intermittent pain in the occiput which is benign and not the source of concern.  She will continue to pursue conservative measures.            Circulatory    Cerebral venous sinus thrombosis    Current Assessment & Plan     The patient had a venous sinus thrombosis.  She is now on full anticoagulation and is doing great.  No additional diagnostics or treatments are indicated.            Other    Vertigo    Current Assessment & Plan     The patient had benign paroxysmal positional vertigo.  Fortunately this benign condition has resolved with conservative measures.  She may have an intermittent bouts of dizziness which is nothing to worry about.               It was a real pleasure treating Oralia Hernandes today, thank you for allowing me to participate in the medical care. If you have any questions, please call me at any time. Oralia Hernandes will follow up with me in the coming weeks to months and keep me updated by telephone. Oralia Hernandes knows to notify me immediately if there is any change in the condition or if there are any new symptoms of transient or static neurologic dysfunction.    Justin Branch MD

## 2022-03-04 NOTE — ASSESSMENT & PLAN NOTE
The patient had a venous sinus thrombosis.  She is now on full anticoagulation and is doing great.  No additional diagnostics or treatments are indicated.

## 2022-04-06 ENCOUNTER — IMPORTED ENCOUNTER (OUTPATIENT)
Dept: URBAN - METROPOLITAN AREA CLINIC 59 | Facility: CLINIC | Age: 62
End: 2022-04-06

## 2022-04-06 PROBLEM — H00.012 STYE OF RT LOWER EYELID: Noted: 2022-04-06

## 2022-04-06 PROCEDURE — 99214 OFFICE O/P EST MOD 30 MIN: CPT

## 2022-04-27 ENCOUNTER — IMPORTED ENCOUNTER (OUTPATIENT)
Dept: URBAN - METROPOLITAN AREA CLINIC 59 | Facility: CLINIC | Age: 62
End: 2022-04-27

## 2022-04-27 PROBLEM — C50.812 MALIGNANT NEOPLASM OF OVERLAPPING SITES OF LEFT FEMALE BREAST: Noted: 2022-04-27

## 2022-04-27 PROBLEM — H04.123 TEAR FILM INSUFFICIENCY OF BILATERAL LACRIMAL GLANDS: Noted: 2022-04-27

## 2022-04-27 PROBLEM — H25.13 BILATERAL NUCLEAR SCLEROSIS CATARACTS: Noted: 2022-04-27

## 2022-04-27 PROBLEM — H00.012 STYE OF RT LOWER EYELID: Noted: 2022-04-27

## 2022-04-27 PROCEDURE — 99212 OFFICE O/P EST SF 10 MIN: CPT

## 2022-09-28 ENCOUNTER — ESTABLISHED COMPREHENSIVE EXAM (OUTPATIENT)
Dept: URBAN - METROPOLITAN AREA CLINIC 55 | Facility: CLINIC | Age: 62
End: 2022-09-28

## 2022-09-28 DIAGNOSIS — H04.123: ICD-10-CM

## 2022-09-28 DIAGNOSIS — H25.813: ICD-10-CM

## 2022-09-28 DIAGNOSIS — H02.88B: ICD-10-CM

## 2022-09-28 DIAGNOSIS — H02.88A: ICD-10-CM

## 2022-09-28 PROCEDURE — 92014 COMPRE OPH EXAM EST PT 1/>: CPT

## 2022-09-28 ASSESSMENT — TONOMETRY
OD_IOP_MMHG: 12
OS_IOP_MMHG: 14

## 2022-09-28 ASSESSMENT — VISUAL ACUITY
OD_SC: 20/20
OS_SC: 20/20

## 2023-03-08 ENCOUNTER — OFFICE VISIT (OUTPATIENT)
Dept: NEUROLOGY | Facility: CLINIC | Age: 63
End: 2023-03-08
Payer: COMMERCIAL

## 2023-03-08 VITALS — DIASTOLIC BLOOD PRESSURE: 60 MMHG | SYSTOLIC BLOOD PRESSURE: 110 MMHG | HEART RATE: 68 BPM | OXYGEN SATURATION: 99 %

## 2023-03-08 DIAGNOSIS — R42 VERTIGO: ICD-10-CM

## 2023-03-08 DIAGNOSIS — G08 CEREBRAL VENOUS SINUS THROMBOSIS: ICD-10-CM

## 2023-03-08 DIAGNOSIS — R51.9 NONINTRACTABLE EPISODIC HEADACHE, UNSPECIFIED HEADACHE TYPE: Primary | ICD-10-CM

## 2023-03-08 PROCEDURE — 99213 OFFICE O/P EST LOW 20 MIN: CPT | Performed by: PSYCHIATRY & NEUROLOGY

## 2023-03-08 ASSESSMENT — PAIN SCALES - GENERAL: PAINLEVEL: 0-NO PAIN

## 2023-03-08 NOTE — PROGRESS NOTES
Oralia Hernandes is a 62 y.o. female  3/8/2023  Farideh Bain PA C    Neurology Follow Up Note    Subjective     Oralia Hernandes is a 62 y.o. female who is being evaluated  for venous sinus thrombosis.  I previously saw the patient 1 year ago.  At that time I recommended conservative measures.    Since her last visit, the patient reported that she has actually been feeling quite well.  She reported that she works as a  which is physically demanding making her feel quite strong.  She reported rare pain in the right occiput which typically occurs when she is sick.  She has had a particularly difficult past few months with viral illnesses, allergies and exacerbations of asthma.  She has been treated with antibiotics and steroids.  She reported that on occasion she feels slightly lightheaded when she changes positions.  If she sits down her symptoms quickly resolve.    The patient recently has had mild right-sided lower back pain and is receiving therapy.  She eats well.  Sometimes she struggles with her sleep and takes Benadryl as needed.  In general her mood is good.  Her daughter continues to hermosillo lightheadedness following a syncopal event and concussion 1 year ago.  Detailed neurologic and medical review of systems was unremarkable.  There were no symptoms to suggest increased intracranial pressure, meningitis or systemic illness.    Review of Systems  Constitutional: negative  Eyes: negative  Ears, nose, mouth, throat, and face: negative  Respiratory: negative  Cardiovascular: negative  Gastrointestinal: negative  Genitourinary:negative  Integument/breast: negative  Hematologic/lymphatic: negative  Musculoskeletal:negative  Neurological: negative  Behavioral/Psych: negative  Endocrine: negative  Allergic/Immunologic: negative    Current Outpatient Medications   Medication Sig Dispense Refill   • albuterol HFA (PROVENTIL HFA) 90 mcg/actuation inhaler Inhale 2 puffs every 6 (six) hours as needed.     •  budesonide-formoterol (SYMBICORT) 160-4.5 mcg/actuation inhaler Inhale 2 puffs 2 times daily.     • LORazepam (ATIVAN) 0.5 mg tablet Take 0.5 mg by mouth 3 (three) times a day as needed.     • montelukast (SINGULAIR) 10 mg tablet Take 10 mg by mouth daily.     • terbutaline (BRETHINE) 2.5 mg tablet Take 1 tablet by mouth 3 (three) times a day as needed.     • warfarin (COUMADIN) 5 mg tablet Take 10 mg by mouth once daily. Take 14 mg/day       No current facility-administered medications for this visit.       PMH/SH/FH : Unchanged since previous visit.    Objective     Physical Exam  Visit Vitals  /60 (BP Location: Right upper arm, Patient Position: Sitting)   Pulse 68   SpO2 99%       General Appearance:  Alert, no distress, appears stated age  Mental status testing was normal  The cranial nerves were normal  There was normal bulk and tone with no abnormal movements  Strength was 5/5  The sensory examination was normal  Reflexes were symmetric  The gait was narrow based         Problem List Items Addressed This Visit        Nervous    Headache - Primary    Current Assessment & Plan     The patient has extraordinarily rare headaches which are completely benign in nature.  No additional diagnostics or treatments are indicated.  She will pursue conservative measures with over-the-counter anti-inflammatory medications or analgesics if needed.            Circulatory    Cerebral venous sinus thrombosis    Current Assessment & Plan     The patient had a venous sinus thrombosis.  She is now on full anticoagulation and is doing great.  She will continue to follow with hematology.  No additional diagnostics or treatments are indicated.            Mental Health    Vertigo    Current Assessment & Plan     The patient had benign paroxysmal positional vertigo.  Fortunately this benign condition has resolved with conservative measures.  Today she reported positional lightheadedness which is more consistent with orthostasis.   No additional diagnostics or treatments are indicated.            It was a real pleasure treating Oralia Hernandes today, thank you for allowing me to participate in the medical care. If you have any questions, please call me at any time. Oralia Hrenandes will follow up with me in the coming weeks to months and keep me updated by telephone. Oralia Hernandes knows to notify me immediately if there is any change in the condition or if there are any new symptoms of transient or static neurologic dysfunction.    Justin Branch MD

## 2023-03-08 NOTE — ASSESSMENT & PLAN NOTE
The patient has extraordinarily rare headaches which are completely benign in nature.  No additional diagnostics or treatments are indicated.  She will pursue conservative measures with over-the-counter anti-inflammatory medications or analgesics if needed.

## 2023-03-08 NOTE — LETTER
March 8, 2023     KELLEY Johnson  [address]  [address]  [place] [address]    Patient: Oralia Hernandes  YOB: 1960  Date of Visit: 3/8/2023      Dear Dr. Bain:    Thank you for referring Oralia Hernandes to me for evaluation. Below are my notes for this consultation.    If you have questions, please do not hesitate to call me. I look forward to following your patient along with you.         Sincerely,        Justin Branch MD        CC: No Recipients    Justin Branch MD  3/8/2023 10:46 AM  Signed  Oralia Hernandes is a 62 y.o. female  3/8/2023  Farideh Bain PA C    Neurology Follow Up Note    Subjective     Oralia Hernandes is a 62 y.o. female who is being evaluated  for venous sinus thrombosis.  I previously saw the patient 1 year ago.  At that time I recommended conservative measures.    Since her last visit, the patient reported that she has actually been feeling quite well.  She reported that she works as a  which is physically demanding making her feel quite strong.  She reported rare pain in the right occiput which typically occurs when she is sick.  She has had a particularly difficult past few months with viral illnesses, allergies and exacerbations of asthma.  She has been treated with antibiotics and steroids.  She reported that on occasion she feels slightly lightheaded when she changes positions.  If she sits down her symptoms quickly resolve.    The patient recently has had mild right-sided lower back pain and is receiving therapy.  She eats well.  Sometimes she struggles with her sleep and takes Benadryl as needed.  In general her mood is good.  Her daughter continues to her[place] lightheadedness following a syncopal event and concussion 1 year ago.  Detailed neurologic and medical review of systems was unremarkable.  There were no symptoms to suggest increased intracranial pressure, meningitis or systemic illness.    Review of Systems  Constitutional:  negative  Eyes: negative  Ears, nose, mouth, throat, and face: negative  Respiratory: negative  Cardiovascular: negative  Gastrointestinal: negative  Genitourinary:negative  Integument/breast: negative  Hematologic/lymphatic: negative  Musculoskeletal:negative  Neurological: negative  Behavioral/Psych: negative  Endocrine: negative  Allergic/Immunologic: negative    Current Outpatient Medications   Medication Sig Dispense Refill   • albuterol HFA (PROVENTIL HFA) 90 mcg/actuation inhaler Inhale 2 puffs every 6 (six) hours as needed.     • budesonide-formoterol (SYMBICORT) 160-4.5 mcg/actuation inhaler Inhale 2 puffs 2 times daily.     • LORazepam (ATIVAN) 0.5 mg tablet Take 0.5 mg by mouth 3 (three) times a day as needed.     • montelukast (SINGULAIR) 10 mg tablet Take 10 mg by mouth daily.     • terbutaline (BRETHINE) 2.5 mg tablet Take 1 tablet by mouth 3 (three) times a day as needed.     • warfarin (COUMADIN) 5 mg tablet Take 10 mg by mouth once daily. Take 14 mg/day       No current facility-administered medications for this visit.       PMH/SH/FH : Unchanged since previous visit.    Objective     Physical Exam  Visit Vitals  /60 (BP Location: Right upper arm, Patient Position: Sitting)   Pulse 68   SpO2 99%       General Appearance:  Alert, no distress, appears stated age  Mental status testing was normal  The cranial nerves were normal  There was normal bulk and tone with no abnormal movements  Strength was 5/5  The sensory examination was normal  Reflexes were symmetric  The gait was narrow based         Problem List Items Addressed This Visit        Nervous    Headache - Primary    Current Assessment & Plan     The patient has extraordinarily rare headaches which are completely benign in nature.  No additional diagnostics or treatments are indicated.  She will pursue conservative measures with over-the-counter anti-inflammatory medications or analgesics if needed.            Circulatory    Cerebral  venous sinus thrombosis    Current Assessment & Plan     The patient had a venous sinus thrombosis.  She is now on full anticoagulation and is doing great.  She will continue to follow with hematology.  No additional diagnostics or treatments are indicated.            Mental Health    Vertigo    Current Assessment & Plan     The patient had benign paroxysmal positional vertigo.  Fortunately this benign condition has resolved with conservative measures.  Today she reported positional lightheadedness which is more consistent with orthostasis.  No additional diagnostics or treatments are indicated.            It was a real pleasure treating Oralia Hernandes today, thank you for allowing me to participate in the medical care. If you have any questions, please call me at any time. Oralia Cecilio will follow up with me in the coming weeks to months and keep me updated by telephone. Oralia Hernandes knows to notify me immediately if there is any change in the condition or if there are any new symptoms of transient or static neurologic dysfunction.    Jutsin Branch MD

## 2023-03-08 NOTE — ASSESSMENT & PLAN NOTE
The patient had a venous sinus thrombosis.  She is now on full anticoagulation and is doing great.  She will continue to follow with hematology.  No additional diagnostics or treatments are indicated.

## 2023-03-08 NOTE — ASSESSMENT & PLAN NOTE
The patient had benign paroxysmal positional vertigo.  Fortunately this benign condition has resolved with conservative measures.  Today she reported positional lightheadedness which is more consistent with orthostasis.  No additional diagnostics or treatments are indicated.

## 2023-09-27 ENCOUNTER — ESTABLISHED COMPREHENSIVE EXAM (OUTPATIENT)
Dept: URBAN - METROPOLITAN AREA CLINIC 55 | Facility: CLINIC | Age: 63
End: 2023-09-27

## 2023-09-27 DIAGNOSIS — H02.88A: ICD-10-CM

## 2023-09-27 DIAGNOSIS — H04.123: ICD-10-CM

## 2023-09-27 DIAGNOSIS — H25.813: ICD-10-CM

## 2023-09-27 DIAGNOSIS — H02.88B: ICD-10-CM

## 2023-09-27 PROCEDURE — 92014 COMPRE OPH EXAM EST PT 1/>: CPT

## 2023-09-27 ASSESSMENT — TONOMETRY
OD_IOP_MMHG: 14
OS_IOP_MMHG: 15

## 2023-09-27 ASSESSMENT — VISUAL ACUITY
OD_SC: 20/20-1
OS_SC: 20/20-1

## 2023-10-15 ASSESSMENT — VISUAL ACUITY
OS_SC: 20/20
OD_SC: J1
OS_SC: 20/20
OU_SC: 20/20
OD_SC: 20/20
OS_SC: 20/20-1
OS_CC: 20/20-2
OD_CC: 20/20-2
OS_SC: J1
OS_SC: 20/20
OD_SC: 20/20
OD_SC: 20/20-1
OD_SC: 20/20

## 2023-10-15 ASSESSMENT — TONOMETRY
OS_IOP_MMHG: 14
OS_IOP_MMHG: 16
OS_IOP_MMHG: 14
OD_IOP_MMHG: 14
OS_IOP_MMHG: 16
OD_IOP_MMHG: 15
OD_IOP_MMHG: 14
OD_IOP_MMHG: 11
OD_IOP_MMHG: 14
OD_IOP_MMHG: 12
OS_IOP_MMHG: 12
OS_IOP_MMHG: 11

## 2024-06-05 ENCOUNTER — OFFICE VISIT (OUTPATIENT)
Dept: NEUROLOGY | Facility: CLINIC | Age: 64
End: 2024-06-05
Payer: COMMERCIAL

## 2024-06-05 VITALS — OXYGEN SATURATION: 98 % | SYSTOLIC BLOOD PRESSURE: 120 MMHG | DIASTOLIC BLOOD PRESSURE: 78 MMHG | HEART RATE: 80 BPM

## 2024-06-05 DIAGNOSIS — G08 CEREBRAL VENOUS SINUS THROMBOSIS: Primary | ICD-10-CM

## 2024-06-05 PROCEDURE — 99214 OFFICE O/P EST MOD 30 MIN: CPT | Performed by: PSYCHIATRY & NEUROLOGY

## 2024-06-05 ASSESSMENT — PAIN SCALES - GENERAL: PAINLEVEL: 0-NO PAIN

## 2024-06-05 NOTE — LETTER
June 5, 2024     KELLEY Johnson  [address]  [address]  [place] [address]    Patient: Oralia Hernandes  YOB: 1960  Date of Visit: 6/5/2024      Dear Dr. Bain:    Thank you for referring Oralia Hernandes to me for evaluation. Below are my notes for this consultation.    If you have questions, please do not hesitate to call me. I look forward to following your patient along with you.         Sincerely,        Justin Branch MD        CC: No Recipients    Justin Branch MD  6/5/2024 10:33 AM  Signed  Oralia Hernandes is a 63 y.o. female  6/5/2024  Farideh Bain PA C    Neurology follow-up note    Subjective     Oralia Hernandes is a 63 y.o. female who is being evaluated for venous sinus thrombosis.  I previously saw the patient a year ago at which time she was stable on Coumadin.  I recommended no additional diagnostics or treatments.    Since her last visit, unfortunately the patient has had a very difficult year.  The patient's oldest biological son passed away in August 2023 from a drug overdose.  She has a 7-year-old granddaughter who she remains in close contact with.  The patient does have 2 other biological children and 2 other stepchildren.  She is obviously psychologically devastated and takes Ativan and Gummies at night to help her sleep.    From a physical perspective the patient fell and broke her right elbow however fortunately she did not require surgery.  She had a ruptured followed by infected left breast implant which required surgery.  In addition she contracted COVID.  From a neurology perspective she has otherwise been doing well.  She has rare headaches, lightheadedness and dizziness.  She has had no other episodes of transient or static neurologic dysfunction.    Review of Systems  Constitutional: negative  Eyes: negative  Ears, nose, mouth, throat, and face: negative  Respiratory: negative  Cardiovascular: negative  Gastrointestinal:  negative  Genitourinary:negative  Integument/breast: negative  Hematologic/lymphatic: negative  Musculoskeletal:negative  Neurological: negative  Behavioral/Psych: negative  Endocrine: negative  Allergic/Immunologic: negative    Allergy:  Allergies   Allergen Reactions   • Sulfa (Sulfonamide Antibiotics) Hives       Current Outpatient Medications   Medication Sig Dispense Refill   • albuterol HFA (PROVENTIL HFA) 90 mcg/actuation inhaler Inhale 2 puffs every 6 (six) hours as needed.     • budesonide-formoterol (SYMBICORT) 160-4.5 mcg/actuation inhaler Inhale 2 puffs 2 times daily.     • LORazepam (ATIVAN) 0.5 mg tablet Take 0.5 mg by mouth 3 (three) times a day as needed.     • montelukast (SINGULAIR) 10 mg tablet Take 10 mg by mouth daily.     • terbutaline (BRETHINE) 2.5 mg tablet Take 1 tablet by mouth 3 (three) times a day as needed.     • warfarin (COUMADIN) 5 mg tablet Take 10 mg by mouth once daily. Take 14 mg/day       No current facility-administered medications for this visit.       Past Medical History:  No past medical history on file.    Past Surgical History:  No past surgical history on file.    Social History:  Social History     Socioeconomic History   • Marital status:      Spouse name: None   • Number of children: None   • Years of education: None   • Highest education level: None   Tobacco Use   • Smoking status: Never   • Smokeless tobacco: Never       Family History:  No family history on file.    Objective     Physical Exam  Visit Vitals  /78 (BP Location: Left upper arm, Patient Position: Sitting)   Pulse 80   SpO2 98%       General Appearance:  Alert, no distress, appears stated age    The cranial nerves were normal  There was normal bulk and tone  Strength was 5/5  The sensory examination was normal  Reflexes were symmetric  The gait was narrow based           Problem List Items Addressed This Visit        Circulatory    Cerebral venous sinus thrombosis - Primary    Current  Assessment & Plan     The patient had a venous sinus thrombosis years ago.  She is now on full anticoagulation and is doing great physically and cognitively.  She will continue to follow with hematology.  No additional diagnostics or treatments are indicated however in the future will depend on her clinical course.              It was a real pleasure meeting Oralia Hernandes today, thank you for allowing me to participate in the medical care. If you have any questions, please call me at any time. Oralia Hernandes will follow up with me in the coming weeks to months and keep me updated by telephone. Oralia Hernandes knows to notify me immediately if there is any change in the condition or if there are any new symptoms of transient or static neurologic dysfunction.    Justin Branch MD

## 2024-06-05 NOTE — PROGRESS NOTES
Oralia Hernandes is a 63 y.o. female  6/5/2024  Farideh Bain PA C    Neurology follow-up note    Subjective     Oralia Hernandes is a 63 y.o. female who is being evaluated for venous sinus thrombosis.  I previously saw the patient a year ago at which time she was stable on Coumadin.  I recommended no additional diagnostics or treatments.    Since her last visit, unfortunately the patient has had a very difficult year.  The patient's oldest biological son passed away in August 2023 from a drug overdose.  She has a 7-year-old granddaughter who she remains in close contact with.  The patient does have 2 other biological children and 2 other stepchildren.  She is obviously psychologically devastated and takes Ativan and Gummies at night to help her sleep.    From a physical perspective the patient fell and broke her right elbow however fortunately she did not require surgery.  She had a ruptured followed by infected left breast implant which required surgery.  In addition she contracted COVID.  From a neurology perspective she has otherwise been doing well.  She has rare headaches, lightheadedness and dizziness.  She has had no other episodes of transient or static neurologic dysfunction.    Review of Systems  Constitutional: negative  Eyes: negative  Ears, nose, mouth, throat, and face: negative  Respiratory: negative  Cardiovascular: negative  Gastrointestinal: negative  Genitourinary:negative  Integument/breast: negative  Hematologic/lymphatic: negative  Musculoskeletal:negative  Neurological: negative  Behavioral/Psych: negative  Endocrine: negative  Allergic/Immunologic: negative    Allergy:  Allergies   Allergen Reactions   • Sulfa (Sulfonamide Antibiotics) Hives       Current Outpatient Medications   Medication Sig Dispense Refill   • albuterol HFA (PROVENTIL HFA) 90 mcg/actuation inhaler Inhale 2 puffs every 6 (six) hours as needed.     • budesonide-formoterol (SYMBICORT) 160-4.5 mcg/actuation inhaler Inhale 2 puffs 2  times daily.     • LORazepam (ATIVAN) 0.5 mg tablet Take 0.5 mg by mouth 3 (three) times a day as needed.     • montelukast (SINGULAIR) 10 mg tablet Take 10 mg by mouth daily.     • terbutaline (BRETHINE) 2.5 mg tablet Take 1 tablet by mouth 3 (three) times a day as needed.     • warfarin (COUMADIN) 5 mg tablet Take 10 mg by mouth once daily. Take 14 mg/day       No current facility-administered medications for this visit.       Past Medical History:  No past medical history on file.    Past Surgical History:  No past surgical history on file.    Social History:  Social History     Socioeconomic History   • Marital status:      Spouse name: None   • Number of children: None   • Years of education: None   • Highest education level: None   Tobacco Use   • Smoking status: Never   • Smokeless tobacco: Never       Family History:  No family history on file.    Objective     Physical Exam  Visit Vitals  /78 (BP Location: Left upper arm, Patient Position: Sitting)   Pulse 80   SpO2 98%       General Appearance:  Alert, no distress, appears stated age    The cranial nerves were normal  There was normal bulk and tone  Strength was 5/5  The sensory examination was normal  Reflexes were symmetric  The gait was narrow based           Problem List Items Addressed This Visit        Circulatory    Cerebral venous sinus thrombosis - Primary    Current Assessment & Plan     The patient had a venous sinus thrombosis years ago.  She is now on full anticoagulation and is doing great physically and cognitively.  She will continue to follow with hematology.  No additional diagnostics or treatments are indicated however in the future will depend on her clinical course.              It was a real pleasure meeting Oralia Hernandes today, thank you for allowing me to participate in the medical care. If you have any questions, please call me at any time. Oralia Hernandes will follow up with me in the coming weeks to months and keep me  updated by telephone. Oralia Cecilio knows to notify me immediately if there is any change in the condition or if there are any new symptoms of transient or static neurologic dysfunction.    Justin Branch MD

## 2024-06-05 NOTE — ASSESSMENT & PLAN NOTE
The patient had a venous sinus thrombosis years ago.  She is now on full anticoagulation and is doing great physically and cognitively.  She will continue to follow with hematology.  No additional diagnostics or treatments are indicated however in the future will depend on her clinical course.

## 2024-12-11 ENCOUNTER — ESTABLISHED COMPREHENSIVE EXAM (OUTPATIENT)
Dept: URBAN - METROPOLITAN AREA CLINIC 55 | Facility: CLINIC | Age: 64
End: 2024-12-11

## 2024-12-11 DIAGNOSIS — H02.88B: ICD-10-CM

## 2024-12-11 DIAGNOSIS — H02.88A: ICD-10-CM

## 2024-12-11 DIAGNOSIS — H25.813: ICD-10-CM

## 2024-12-11 DIAGNOSIS — H04.123: ICD-10-CM

## 2024-12-11 PROCEDURE — 92014 COMPRE OPH EXAM EST PT 1/>: CPT

## 2024-12-11 ASSESSMENT — TONOMETRY
OD_IOP_MMHG: 13
OS_IOP_MMHG: 15

## 2024-12-11 ASSESSMENT — VISUAL ACUITY
OS_SC: 20/20
OD_SC: 20/20
OU_CC: J1+

## 2025-06-04 ENCOUNTER — OFFICE VISIT (OUTPATIENT)
Facility: HOSPITAL | Age: 65
End: 2025-06-04
Payer: COMMERCIAL

## 2025-06-04 VITALS — HEART RATE: 63 BPM | OXYGEN SATURATION: 98 % | DIASTOLIC BLOOD PRESSURE: 80 MMHG | SYSTOLIC BLOOD PRESSURE: 130 MMHG

## 2025-06-04 DIAGNOSIS — G08 CEREBRAL VENOUS SINUS THROMBOSIS: Primary | ICD-10-CM

## 2025-06-04 DIAGNOSIS — R42 VERTIGO: ICD-10-CM

## 2025-06-04 PROCEDURE — 99214 OFFICE O/P EST MOD 30 MIN: CPT | Performed by: PSYCHIATRY & NEUROLOGY

## 2025-06-04 ASSESSMENT — PAIN SCALES - GENERAL: PAINLEVEL_OUTOF10: 0-NO PAIN

## 2025-06-04 NOTE — ASSESSMENT & PLAN NOTE
The patient has transient episodes of dizziness.  Fortunately she has had no lightheadedness or significant vertigo.  Her exam was nonfocal and for now we will take a wait-and-see approach.

## 2025-06-04 NOTE — ASSESSMENT & PLAN NOTE
The patient had a venous sinus thrombosis years ago.  She is now on full anticoagulation and is doing great physically and cognitively.  She is tolerating medications well without any side effects.  She will continue to follow with hematology.  No additional diagnostics or treatments are indicated however in the future will depend on her clinical course.

## 2025-06-04 NOTE — LETTER
"June 4, 2025     KELLEY Johnson  [address]  [address]  [place] [address]    Patient: Oralia Hernandes  YOB: 1960  Date of Visit: 6/4/2025      Dear Dr. Bain:    Thank you for referring Oralia Hernandes to me for evaluation. Below are my notes for this consultation.    If you have questions, please do not hesitate to call me. I look forward to following your patient along with you.         Sincerely,        Justin Branch MD        CC: No Recipients    Justin Branch MD  6/4/2025 10:35 AM  Sign when Signing Visit  Oralia Hernandes is a 64 y.o. female  6/4/2025  Farideh Bain PA C    Neurology Follow Up Note    Subjective    Oralia Hernandes is a 64 y.o. female who is being evaluated  for a venous sinus thrombosis.  I previously saw the patient a year ago.  At that time clinically she was doing well.  I recommended ongoing anticoagulation.    Since her last visit, the patient reported that in general she has been doing well.  She reported that she did have brief episodes of \"dizziness\" 4 days in a row.  She reported that each day she felt slightly dizzy for about a minute or 2.  She did not have severe spinning vertigo.  She denied lightheadedness, presyncope, altered mental status or loss of consciousness.  She wonders whether her symptoms may have been related to taking lorazepam and a THC gummy to help her sleep and alcohol consumption due to stress.    The patient reported that unfortunately her mother passed away good Friday.  She had a history of COPD and likely had a superimposed infection.  In general the patient's been eating well and has probably gained a little bit of weight.  Sometimes she struggles with her sleep.  Her mood has obviously been up and down, due to multiple psycho social stressors in her life.    The patient was worked up for abdominal pain.  Apparently blood work showed a slightly elevated sedimentation rate.  She had a comprehensive workup including that by " cardiology who recommended she follow-up in 5 years.  The patient continues to work 2 days a week and a very physically demanding job without difficulty.  Detailed neurologic and medical review of systems was unremarkable.  There were no symptoms to suggest increased intracranial pressure, meningitis or systemic illness.  She has otherwise had no episodes of transient or static neurologic dysfunction.    Review of Systems  Constitutional: negative  Eyes: negative  Ears, nose, mouth, throat, and face: negative  Respiratory: negative  Cardiovascular: negative  Gastrointestinal: negative  Genitourinary:negative  Integument/breast: negative  Hematologic/lymphatic: negative  Musculoskeletal:negative  Neurological: negative  Behavioral/Psych: negative  Endocrine: negative  Allergic/Immunologic: negative    Current Outpatient Medications   Medication Sig Dispense Refill   • albuterol HFA (PROVENTIL HFA) 90 mcg/actuation inhaler Inhale 2 puffs every 6 (six) hours as needed.     • budesonide-formoterol (SYMBICORT) 160-4.5 mcg/actuation inhaler Inhale 2 puffs 2 times daily.     • LORazepam (ATIVAN) 0.5 mg tablet Take 0.5 mg by mouth 3 (three) times a day as needed.     • montelukast (SINGULAIR) 10 mg tablet Take 10 mg by mouth daily.     • terbutaline (BRETHINE) 2.5 mg tablet Take 1 tablet by mouth 3 (three) times a day as needed.     • warfarin (COUMADIN) 5 mg tablet Take 10 mg by mouth once daily. Take 14 mg/day       No current facility-administered medications for this visit.       PMH/SH/FH : Unchanged since previous visit.    Objective    Physical Exam  Visit Vitals  /80 (BP Location: Left upper arm, Patient Position: Sitting)   Pulse 63   SpO2 98%       General Appearance:  Alert, no distress, appears stated age  Mental status was normal  The cranial nerves were normal  There was normal bulk and tone  There were no abnormal movements  Strength was 5/5  The sensory examination was normal  Reflexes were symmetric    The gait was narrow-based         Problem List Items Addressed This Visit          Nervous    Vertigo    Current Assessment & Plan   The patient has transient episodes of dizziness.  Fortunately she has had no lightheadedness or significant vertigo.  Her exam was nonfocal and for now we will take a wait-and-see approach.            Circulatory    Cerebral venous sinus thrombosis - Primary    Current Assessment & Plan   The patient had a venous sinus thrombosis years ago.  She is now on full anticoagulation and is doing great physically and cognitively.  She is tolerating medications well without any side effects.  She will continue to follow with hematology.  No additional diagnostics or treatments are indicated however in the future will depend on her clinical course.            It was a real pleasure treating Oralia Hernandes today, thank you for allowing me to participate in the medical care. If you have any questions, please call me at any time. Oralia Cecilio will follow up with me in the coming weeks to months and keep me updated by telephone. Oralia Hernandes knows to notify me immediately if there is any change in the condition or if there are any new symptoms of transient or static neurologic dysfunction.    Justin Branch MD

## 2025-06-04 NOTE — PROGRESS NOTES
"Oralia Hernandes is a 64 y.o. female  6/4/2025  Farideh Bain PA C    Neurology Follow Up Note    Subjective     Oralia Hernandes is a 64 y.o. female who is being evaluated  for a venous sinus thrombosis.  I previously saw the patient a year ago.  At that time clinically she was doing well.  I recommended ongoing anticoagulation.    Since her last visit, the patient reported that in general she has been doing well.  She reported that she did have brief episodes of \"dizziness\" 4 days in a row.  She reported that each day she felt slightly dizzy for about a minute or 2.  She did not have severe spinning vertigo.  She denied lightheadedness, presyncope, altered mental status or loss of consciousness.  She wonders whether her symptoms may have been related to taking lorazepam and a THC gummy to help her sleep and alcohol consumption due to stress.    The patient reported that unfortunately her mother passed away good Friday.  She had a history of COPD and likely had a superimposed infection.  In general the patient's been eating well and has probably gained a little bit of weight.  Sometimes she struggles with her sleep.  Her mood has obviously been up and down, due to multiple psycho social stressors in her life.    The patient was worked up for abdominal pain.  Apparently blood work showed a slightly elevated sedimentation rate.  She had a comprehensive workup including that by cardiology who recommended she follow-up in 5 years.  The patient continues to work 2 days a week and a very physically demanding job without difficulty.  Detailed neurologic and medical review of systems was unremarkable.  There were no symptoms to suggest increased intracranial pressure, meningitis or systemic illness.  She has otherwise had no episodes of transient or static neurologic dysfunction.    Review of Systems  Constitutional: negative  Eyes: negative  Ears, nose, mouth, throat, and face: negative  Respiratory: negative  Cardiovascular: " negative  Gastrointestinal: negative  Genitourinary:negative  Integument/breast: negative  Hematologic/lymphatic: negative  Musculoskeletal:negative  Neurological: negative  Behavioral/Psych: negative  Endocrine: negative  Allergic/Immunologic: negative    Current Outpatient Medications   Medication Sig Dispense Refill    albuterol HFA (PROVENTIL HFA) 90 mcg/actuation inhaler Inhale 2 puffs every 6 (six) hours as needed.      budesonide-formoterol (SYMBICORT) 160-4.5 mcg/actuation inhaler Inhale 2 puffs 2 times daily.      LORazepam (ATIVAN) 0.5 mg tablet Take 0.5 mg by mouth 3 (three) times a day as needed.      montelukast (SINGULAIR) 10 mg tablet Take 10 mg by mouth daily.      terbutaline (BRETHINE) 2.5 mg tablet Take 1 tablet by mouth 3 (three) times a day as needed.      warfarin (COUMADIN) 5 mg tablet Take 10 mg by mouth once daily. Take 14 mg/day       No current facility-administered medications for this visit.       PMH/SH/FH : Unchanged since previous visit.    Objective     Physical Exam  Visit Vitals  /80 (BP Location: Left upper arm, Patient Position: Sitting)   Pulse 63   SpO2 98%       General Appearance:  Alert, no distress, appears stated age  Mental status was normal  The cranial nerves were normal  There was normal bulk and tone  There were no abnormal movements  Strength was 5/5  The sensory examination was normal  Reflexes were symmetric   The gait was narrow-based         Problem List Items Addressed This Visit          Nervous    Vertigo    Current Assessment & Plan   The patient has transient episodes of dizziness.  Fortunately she has had no lightheadedness or significant vertigo.  Her exam was nonfocal and for now we will take a wait-and-see approach.            Circulatory    Cerebral venous sinus thrombosis - Primary    Current Assessment & Plan   The patient had a venous sinus thrombosis years ago.  She is now on full anticoagulation and is doing great physically and cognitively.   She is tolerating medications well without any side effects.  She will continue to follow with hematology.  No additional diagnostics or treatments are indicated however in the future will depend on her clinical course.            It was a real pleasure treating Oralia Hernandes today, thank you for allowing me to participate in the medical care. If you have any questions, please call me at any time. Oralia Hernandes will follow up with me in the coming weeks to months and keep me updated by telephone. Oralia Hernandes knows to notify me immediately if there is any change in the condition or if there are any new symptoms of transient or static neurologic dysfunction.    Justin Branch MD

## 2025-07-02 ENCOUNTER — EMERGENCY ADD-ON (OUTPATIENT)
Dept: URBAN - METROPOLITAN AREA CLINIC 22 | Facility: CLINIC | Age: 65
End: 2025-07-02

## 2025-07-02 DIAGNOSIS — H04.123: ICD-10-CM

## 2025-07-02 PROCEDURE — 99214 OFFICE O/P EST MOD 30 MIN: CPT

## 2025-07-02 ASSESSMENT — VISUAL ACUITY
OS_SC: 20/20
OD_SC: 20/20

## 2025-07-02 ASSESSMENT — TONOMETRY
OS_IOP_MMHG: 14
OD_IOP_MMHG: 14

## (undated) RX ORDER — ERYTHROMYCIN 5 MG/G: 1/4 OINTMENT OPHTHALMIC TWICE A DAY